# Patient Record
Sex: FEMALE | Race: BLACK OR AFRICAN AMERICAN | NOT HISPANIC OR LATINO | URBAN - METROPOLITAN AREA
[De-identification: names, ages, dates, MRNs, and addresses within clinical notes are randomized per-mention and may not be internally consistent; named-entity substitution may affect disease eponyms.]

---

## 2020-08-27 ENCOUNTER — OFFICE (OUTPATIENT)
Dept: URBAN - METROPOLITAN AREA CLINIC 10 | Facility: CLINIC | Age: 30
End: 2020-08-27

## 2020-08-27 VITALS
WEIGHT: 217 LBS | HEART RATE: 77 BPM | OXYGEN SATURATION: 99 % | DIASTOLIC BLOOD PRESSURE: 81 MMHG | SYSTOLIC BLOOD PRESSURE: 120 MMHG | HEIGHT: 67 IN

## 2020-08-27 DIAGNOSIS — R93.2 ABNORMAL FINDINGS ON DIAGNOSTIC IMAGING OF LIVER AND BILIARY: ICD-10-CM

## 2020-08-27 DIAGNOSIS — R10.13 EPIGASTRIC PAIN: ICD-10-CM

## 2020-08-27 DIAGNOSIS — K21.9 GASTRO-ESOPHAGEAL REFLUX DISEASE WITHOUT ESOPHAGITIS: ICD-10-CM

## 2020-08-27 DIAGNOSIS — R11.0 NAUSEA: ICD-10-CM

## 2020-08-27 PROCEDURE — 99204 OFFICE O/P NEW MOD 45 MIN: CPT | Performed by: PHYSICIAN ASSISTANT

## 2020-08-27 RX ORDER — PANTOPRAZOLE SODIUM 40 MG/1
40 TABLET, DELAYED RELEASE ORAL
Qty: 30 | Refills: 11 | Status: ACTIVE
Start: 2020-08-27

## 2020-08-27 NOTE — SERVICEHPINOTES
Ms. Woodson is 29 years old and is here today for ER f/u for epigastric pain that was dull and sharp days prior to presentation. Noted radiation to her back. She noted nausea but no vomiting. She denies any bowel changes. She denies any overt GI bleeding. She has hx of PCOS and heavy menstrual cycles. She has a hx of MESHA and GERD. She was on PO iron but doesn't like taking it because it causes constipation. She used to be a pt at [facility].Since leaving the ED, she notes abd pain improvement and resolution. She thinks maybe the pain was d/t something she ate perhaps. She was prescribed Protonix but hasn' t been taking.  She has been feeling much better.  Denies any fever or chills. Review of records from [facility] 08/07/2020:BRAST 11/ALT 13/alp 88/total bili 0.4/in 3.5BRLipase 104BRWBC 6.3/hemoglobin 8.4/hematocrit 27.6/MCV 68.7/platelets 314BRTroponin negativeBRChest x-ray was negativeBRCT chest abdomen and pelvis-no acute abnormality specifically no evidence of PE gallbladder is slightly prominent in caliber without definite acute cholecystitis

## 2020-08-27 NOTE — SERVICENOTES
EULOGIO was available for real-time consultation if needed; however, I did not examine the patient. After discussing the case with Darell Carson will plan on HIDA scan with ejection fraction given abnormality seen on CT scan.  He had discussed H pylori testing with her; however, she would like to have upper endoscopy H pylori testing after risks and benefits have been discussed.